# Patient Record
Sex: FEMALE | Race: WHITE | NOT HISPANIC OR LATINO
[De-identification: names, ages, dates, MRNs, and addresses within clinical notes are randomized per-mention and may not be internally consistent; named-entity substitution may affect disease eponyms.]

---

## 2024-08-02 ENCOUNTER — APPOINTMENT (OUTPATIENT)
Dept: ANTEPARTUM | Facility: CLINIC | Age: 34
End: 2024-08-02

## 2024-08-03 ENCOUNTER — INPATIENT (INPATIENT)
Facility: HOSPITAL | Age: 34
LOS: 2 days | Discharge: HOME | End: 2024-08-06
Attending: SPECIALIST | Admitting: SPECIALIST
Payer: MEDICAID

## 2024-08-03 VITALS — HEART RATE: 85 BPM | SYSTOLIC BLOOD PRESSURE: 178 MMHG | DIASTOLIC BLOOD PRESSURE: 100 MMHG

## 2024-08-03 DIAGNOSIS — O26.899 OTHER SPECIFIED PREGNANCY RELATED CONDITIONS, UNSPECIFIED TRIMESTER: ICD-10-CM

## 2024-08-03 LAB
ALBUMIN SERPL ELPH-MCNC: 3.4 G/DL — SIGNIFICANT CHANGE UP (ref 3.3–5)
ALP SERPL-CCNC: 100 U/L — SIGNIFICANT CHANGE UP (ref 40–120)
ALT FLD-CCNC: 8 U/L — SIGNIFICANT CHANGE UP (ref 4–33)
AMPHET UR-MCNC: NEGATIVE — SIGNIFICANT CHANGE UP
ANION GAP SERPL CALC-SCNC: 16 MMOL/L — HIGH (ref 7–14)
APPEARANCE UR: ABNORMAL
AST SERPL-CCNC: 19 U/L — SIGNIFICANT CHANGE UP (ref 4–32)
BARBITURATES UR SCN-MCNC: NEGATIVE — SIGNIFICANT CHANGE UP
BASOPHILS # BLD AUTO: 0.02 K/UL — SIGNIFICANT CHANGE UP (ref 0–0.2)
BASOPHILS NFR BLD AUTO: 0.2 % — SIGNIFICANT CHANGE UP (ref 0–2)
BENZODIAZ UR-MCNC: NEGATIVE — SIGNIFICANT CHANGE UP
BILIRUB SERPL-MCNC: <0.2 MG/DL — SIGNIFICANT CHANGE UP (ref 0.2–1.2)
BILIRUB UR-MCNC: NEGATIVE — SIGNIFICANT CHANGE UP
BLD GP AB SCN SERPL QL: NEGATIVE — SIGNIFICANT CHANGE UP
BUN SERPL-MCNC: 8 MG/DL — SIGNIFICANT CHANGE UP (ref 7–23)
CALCIUM SERPL-MCNC: 8.8 MG/DL — SIGNIFICANT CHANGE UP (ref 8.4–10.5)
CHLORIDE SERPL-SCNC: 104 MMOL/L — SIGNIFICANT CHANGE UP (ref 98–107)
CO2 SERPL-SCNC: 19 MMOL/L — LOW (ref 22–31)
COCAINE METAB.OTHER UR-MCNC: NEGATIVE — SIGNIFICANT CHANGE UP
COLOR SPEC: YELLOW — SIGNIFICANT CHANGE UP
COMMENT - URINE: SIGNIFICANT CHANGE UP
CREAT ?TM UR-MCNC: 145 MG/DL — SIGNIFICANT CHANGE UP
CREAT SERPL-MCNC: 0.6 MG/DL — SIGNIFICANT CHANGE UP (ref 0.5–1.3)
CREATININE URINE RESULT, DAU: 148 MG/DL — SIGNIFICANT CHANGE UP
DIFF PNL FLD: ABNORMAL
EGFR: 121 ML/MIN/1.73M2 — SIGNIFICANT CHANGE UP
EOSINOPHIL # BLD AUTO: 0.09 K/UL — SIGNIFICANT CHANGE UP (ref 0–0.5)
EOSINOPHIL NFR BLD AUTO: 1 % — SIGNIFICANT CHANGE UP (ref 0–6)
FENTANYL UR QL SCN: NEGATIVE — SIGNIFICANT CHANGE UP
GLUCOSE SERPL-MCNC: 83 MG/DL — SIGNIFICANT CHANGE UP (ref 70–99)
GLUCOSE UR QL: NEGATIVE MG/DL — SIGNIFICANT CHANGE UP
HBV SURFACE AG SERPL QL IA: SIGNIFICANT CHANGE UP
HCT VFR BLD CALC: 27.9 % — LOW (ref 34.5–45)
HGB BLD-MCNC: 8.9 G/DL — LOW (ref 11.5–15.5)
HIV 1+2 AB+HIV1 P24 AG SERPL QL IA: SIGNIFICANT CHANGE UP
IANC: 6.42 K/UL — SIGNIFICANT CHANGE UP (ref 1.8–7.4)
IMM GRANULOCYTES NFR BLD AUTO: 0.6 % — SIGNIFICANT CHANGE UP (ref 0–0.9)
KETONES UR-MCNC: ABNORMAL MG/DL
LDH SERPL L TO P-CCNC: 195 U/L — SIGNIFICANT CHANGE UP (ref 135–225)
LEUKOCYTE ESTERASE UR-ACNC: ABNORMAL
LYMPHOCYTES # BLD AUTO: 1.57 K/UL — SIGNIFICANT CHANGE UP (ref 1–3.3)
LYMPHOCYTES # BLD AUTO: 17.6 % — SIGNIFICANT CHANGE UP (ref 13–44)
MAGNESIUM SERPL-MCNC: 5 MG/DL — HIGH (ref 1.6–2.6)
MCHC RBC-ENTMCNC: 25.8 PG — LOW (ref 27–34)
MCHC RBC-ENTMCNC: 31.9 GM/DL — LOW (ref 32–36)
MCV RBC AUTO: 80.9 FL — SIGNIFICANT CHANGE UP (ref 80–100)
METHADONE UR-MCNC: NEGATIVE — SIGNIFICANT CHANGE UP
MONOCYTES # BLD AUTO: 0.75 K/UL — SIGNIFICANT CHANGE UP (ref 0–0.9)
MONOCYTES NFR BLD AUTO: 8.4 % — SIGNIFICANT CHANGE UP (ref 2–14)
NEUTROPHILS # BLD AUTO: 6.42 K/UL — SIGNIFICANT CHANGE UP (ref 1.8–7.4)
NEUTROPHILS NFR BLD AUTO: 72.2 % — SIGNIFICANT CHANGE UP (ref 43–77)
NITRITE UR-MCNC: NEGATIVE — SIGNIFICANT CHANGE UP
NRBC # BLD: 0 /100 WBCS — SIGNIFICANT CHANGE UP (ref 0–0)
NRBC # FLD: 0 K/UL — SIGNIFICANT CHANGE UP (ref 0–0)
OPIATES UR-MCNC: NEGATIVE — SIGNIFICANT CHANGE UP
OXYCODONE UR-MCNC: NEGATIVE — SIGNIFICANT CHANGE UP
PCP SPEC-MCNC: SIGNIFICANT CHANGE UP
PCP UR-MCNC: NEGATIVE — SIGNIFICANT CHANGE UP
PH UR: 7.5 — SIGNIFICANT CHANGE UP (ref 5–8)
PLATELET # BLD AUTO: 300 K/UL — SIGNIFICANT CHANGE UP (ref 150–400)
POTASSIUM SERPL-MCNC: 4.1 MMOL/L — SIGNIFICANT CHANGE UP (ref 3.5–5.3)
POTASSIUM SERPL-SCNC: 4.1 MMOL/L — SIGNIFICANT CHANGE UP (ref 3.5–5.3)
PROT ?TM UR-MCNC: 31 MG/DL — SIGNIFICANT CHANGE UP
PROT SERPL-MCNC: 6.4 G/DL — SIGNIFICANT CHANGE UP (ref 6–8.3)
PROT UR-MCNC: 100 MG/DL
PROT/CREAT UR-RTO: 0.2 RATIO — SIGNIFICANT CHANGE UP (ref 0–0.2)
RBC # BLD: 3.45 M/UL — LOW (ref 3.8–5.2)
RBC # FLD: 14.6 % — HIGH (ref 10.3–14.5)
RBC CASTS # UR COMP ASSIST: SIGNIFICANT CHANGE UP /HPF (ref 0–4)
RH IG SCN BLD-IMP: POSITIVE — SIGNIFICANT CHANGE UP
RH IG SCN BLD-IMP: POSITIVE — SIGNIFICANT CHANGE UP
RUBV IGG SER-ACNC: 25.2 INDEX — SIGNIFICANT CHANGE UP
RUBV IGG SER-IMP: POSITIVE — SIGNIFICANT CHANGE UP
SODIUM SERPL-SCNC: 139 MMOL/L — SIGNIFICANT CHANGE UP (ref 135–145)
SP GR SPEC: 1.03 — SIGNIFICANT CHANGE UP (ref 1–1.03)
T PALLIDUM AB TITR SER: NEGATIVE — SIGNIFICANT CHANGE UP
THC UR QL: NEGATIVE — SIGNIFICANT CHANGE UP
URATE SERPL-MCNC: 4.4 MG/DL — SIGNIFICANT CHANGE UP (ref 2.5–7)
UROBILINOGEN FLD QL: 1 MG/DL — SIGNIFICANT CHANGE UP (ref 0.2–1)
WBC # BLD: 8.9 K/UL — SIGNIFICANT CHANGE UP (ref 3.8–10.5)
WBC # FLD AUTO: 8.9 K/UL — SIGNIFICANT CHANGE UP (ref 3.8–10.5)
WBC UR QL: SIGNIFICANT CHANGE UP /HPF (ref 0–5)

## 2024-08-03 PROCEDURE — 59514 CESAREAN DELIVERY ONLY: CPT | Mod: U9,GC

## 2024-08-03 DEVICE — SURGICEL SNOW 2 X 4": Type: IMPLANTABLE DEVICE | Status: FUNCTIONAL

## 2024-08-03 RX ORDER — LANOLIN 100 %
1 OINTMENT (GRAM) TOPICAL EVERY 6 HOURS
Refills: 0 | Status: DISCONTINUED | OUTPATIENT
Start: 2024-08-03 | End: 2024-08-06

## 2024-08-03 RX ORDER — ACETAMINOPHEN 500 MG
975 TABLET ORAL
Refills: 0 | Status: DISCONTINUED | OUTPATIENT
Start: 2024-08-03 | End: 2024-08-06

## 2024-08-03 RX ORDER — TRISODIUM CITRATE DIHYDRATE AND CITRIC ACID MONOHYDRATE 500; 334 MG/5ML; MG/5ML
15 SOLUTION ORAL EVERY 6 HOURS
Refills: 0 | Status: DISCONTINUED | OUTPATIENT
Start: 2024-08-03 | End: 2024-08-03

## 2024-08-03 RX ORDER — MAGNESIUM HYDROXIDE 400 MG/5ML
30 SUSPENSION, ORAL (FINAL DOSE FORM) ORAL
Refills: 0 | Status: DISCONTINUED | OUTPATIENT
Start: 2024-08-03 | End: 2024-08-06

## 2024-08-03 RX ORDER — OXYTOCIN/RINGER'S LACTATE 20/1000 ML
333.33 PLASTIC BAG, INJECTION (ML) INTRAVENOUS
Qty: 20 | Refills: 0 | Status: COMPLETED | OUTPATIENT
Start: 2024-08-03 | End: 2024-08-03

## 2024-08-03 RX ORDER — MAGNESIUM SULFATE 500 MG/ML
2 VIAL (ML) INJECTION
Qty: 40 | Refills: 0 | Status: DISCONTINUED | OUTPATIENT
Start: 2024-08-03 | End: 2024-08-04

## 2024-08-03 RX ORDER — IBUPROFEN 200 MG
600 TABLET ORAL EVERY 6 HOURS
Refills: 0 | Status: COMPLETED | OUTPATIENT
Start: 2024-08-03 | End: 2025-07-02

## 2024-08-03 RX ORDER — NIFEDIPINE 20 MG/1
10 CAPSULE, LIQUID FILLED ORAL ONCE
Refills: 0 | Status: COMPLETED | OUTPATIENT
Start: 2024-08-03 | End: 2024-08-03

## 2024-08-03 RX ORDER — MAGNESIUM SULFATE 500 MG/ML
4 VIAL (ML) INJECTION ONCE
Refills: 0 | Status: COMPLETED | OUTPATIENT
Start: 2024-08-03 | End: 2024-08-03

## 2024-08-03 RX ORDER — OXYTOCIN/RINGER'S LACTATE 20/1000 ML
333.33 PLASTIC BAG, INJECTION (ML) INTRAVENOUS
Qty: 20 | Refills: 0 | Status: DISCONTINUED | OUTPATIENT
Start: 2024-08-03 | End: 2024-08-05

## 2024-08-03 RX ORDER — OXYCODONE HYDROCHLORIDE 30 MG/1
5 TABLET ORAL ONCE
Refills: 0 | Status: DISCONTINUED | OUTPATIENT
Start: 2024-08-03 | End: 2024-08-06

## 2024-08-03 RX ORDER — DEXTROSE MONOHYDRATE, SODIUM CHLORIDE, SODIUM LACTATE, CALCIUM CHLORIDE, MAGNESIUM CHLORIDE 1.5; 538; 448; 18.4; 5.08 G/100ML; MG/100ML; MG/100ML; MG/100ML; MG/100ML
1000 SOLUTION INTRAPERITONEAL ONCE
Refills: 0 | Status: COMPLETED | OUTPATIENT
Start: 2024-08-03 | End: 2024-08-03

## 2024-08-03 RX ORDER — DEXTROSE MONOHYDRATE, SODIUM CHLORIDE, SODIUM LACTATE, CALCIUM CHLORIDE, MAGNESIUM CHLORIDE 1.5; 538; 448; 18.4; 5.08 G/100ML; MG/100ML; MG/100ML; MG/100ML; MG/100ML
1000 SOLUTION INTRAPERITONEAL
Refills: 0 | Status: DISCONTINUED | OUTPATIENT
Start: 2024-08-03 | End: 2024-08-05

## 2024-08-03 RX ORDER — BACTERIOSTATIC SODIUM CHLORIDE 0.9 %
1000 VIAL (ML) INJECTION
Refills: 0 | Status: DISCONTINUED | OUTPATIENT
Start: 2024-08-03 | End: 2024-08-03

## 2024-08-03 RX ORDER — SIMETHICONE 125 MG/1
80 TABLET, CHEWABLE ORAL EVERY 4 HOURS
Refills: 0 | Status: DISCONTINUED | OUTPATIENT
Start: 2024-08-03 | End: 2024-08-06

## 2024-08-03 RX ORDER — OXYCODONE HYDROCHLORIDE 30 MG/1
5 TABLET ORAL
Refills: 0 | Status: DISCONTINUED | OUTPATIENT
Start: 2024-08-03 | End: 2024-08-06

## 2024-08-03 RX ORDER — CLOSTRIDIUM TETANI TOXOID ANTIGEN (FORMALDEHYDE INACTIVATED), CORYNEBACTERIUM DIPHTHERIAE TOXOID ANTIGEN (FORMALDEHYDE INACTIVATED), BORDETELLA PERTUSSIS TOXOID ANTIGEN (GLUTARALDEHYDE INACTIVATED), BORDETELLA PERTUSSIS FILAMENTOUS HEMAGGLUTININ ANTIGEN (FORMALDEHYDE INACTIVATED), BORDETELLA PERTUSSIS PERTACTIN ANTIGEN, AND BORDETELLA PERTUSSIS FIMBRIAE 2/3 ANTIGEN 5; 2; 2.5; 5; 3; 5 [LF]/.5ML; [LF]/.5ML; UG/.5ML; UG/.5ML; UG/.5ML; UG/.5ML
0.5 INJECTION, SUSPENSION INTRAMUSCULAR ONCE
Refills: 0 | Status: DISCONTINUED | OUTPATIENT
Start: 2024-08-03 | End: 2024-08-06

## 2024-08-03 RX ORDER — AMPICILLIN 1 G/1
2 INJECTION, POWDER, FOR SOLUTION INTRAMUSCULAR; INTRAVENOUS ONCE
Refills: 0 | Status: COMPLETED | OUTPATIENT
Start: 2024-08-03 | End: 2024-08-03

## 2024-08-03 RX ORDER — AMPICILLIN 1 G/1
1 INJECTION, POWDER, FOR SOLUTION INTRAMUSCULAR; INTRAVENOUS EVERY 4 HOURS
Refills: 0 | Status: DISCONTINUED | OUTPATIENT
Start: 2024-08-03 | End: 2024-08-03

## 2024-08-03 RX ORDER — HEPARIN SODIUM 1000 [USP'U]/ML
5000 INJECTION, SOLUTION INTRAVENOUS; SUBCUTANEOUS EVERY 12 HOURS
Refills: 0 | Status: DISCONTINUED | OUTPATIENT
Start: 2024-08-03 | End: 2024-08-06

## 2024-08-03 RX ORDER — KETOROLAC TROMETHAMINE 10 MG
30 TABLET ORAL EVERY 6 HOURS
Refills: 0 | Status: DISCONTINUED | OUTPATIENT
Start: 2024-08-03 | End: 2024-08-04

## 2024-08-03 RX ORDER — DEXTROSE MONOHYDRATE, SODIUM CHLORIDE, SODIUM LACTATE, CALCIUM CHLORIDE, MAGNESIUM CHLORIDE 1.5; 538; 448; 18.4; 5.08 G/100ML; MG/100ML; MG/100ML; MG/100ML; MG/100ML
1000 SOLUTION INTRAPERITONEAL
Refills: 0 | Status: DISCONTINUED | OUTPATIENT
Start: 2024-08-03 | End: 2024-08-03

## 2024-08-03 RX ORDER — CHLORHEXIDINE GLUCONATE 500 MG/1
1 CLOTH TOPICAL DAILY
Refills: 0 | Status: DISCONTINUED | OUTPATIENT
Start: 2024-08-03 | End: 2024-08-03

## 2024-08-03 RX ORDER — BACTERIOSTATIC SODIUM CHLORIDE 0.9 %
350 VIAL (ML) INJECTION ONCE
Refills: 0 | Status: COMPLETED | OUTPATIENT
Start: 2024-08-03 | End: 2024-08-03

## 2024-08-03 RX ORDER — DIPHENHYDRAMINE HCL 25 MG
25 CAPSULE ORAL EVERY 6 HOURS
Refills: 0 | Status: DISCONTINUED | OUTPATIENT
Start: 2024-08-03 | End: 2024-08-06

## 2024-08-03 RX ORDER — OXYTOCIN/RINGER'S LACTATE 20/1000 ML
PLASTIC BAG, INJECTION (ML) INTRAVENOUS
Qty: 30 | Refills: 0 | Status: DISCONTINUED | OUTPATIENT
Start: 2024-08-03 | End: 2024-08-03

## 2024-08-03 RX ORDER — MAGNESIUM SULFATE 500 MG/ML
2 VIAL (ML) INJECTION
Qty: 40 | Refills: 0 | Status: DISCONTINUED | OUTPATIENT
Start: 2024-08-03 | End: 2024-08-03

## 2024-08-03 RX ORDER — NIFEDIPINE 20 MG/1
30 CAPSULE, LIQUID FILLED ORAL DAILY
Refills: 0 | Status: DISCONTINUED | OUTPATIENT
Start: 2024-08-03 | End: 2024-08-03

## 2024-08-03 RX ADMIN — Medication 50 GM/HR: at 11:48

## 2024-08-03 RX ADMIN — Medication 50 GM/HR: at 18:26

## 2024-08-03 RX ADMIN — DEXTROSE MONOHYDRATE, SODIUM CHLORIDE, SODIUM LACTATE, CALCIUM CHLORIDE, MAGNESIUM CHLORIDE 125 MILLILITER(S): 1.5; 538; 448; 18.4; 5.08 SOLUTION INTRAPERITONEAL at 16:33

## 2024-08-03 RX ADMIN — SIMETHICONE 80 MILLIGRAM(S): 125 TABLET, CHEWABLE ORAL at 23:57

## 2024-08-03 RX ADMIN — HEPARIN SODIUM 5000 UNIT(S): 1000 INJECTION, SOLUTION INTRAVENOUS; SUBCUTANEOUS at 16:32

## 2024-08-03 RX ADMIN — Medication 1000 MILLIUNIT(S)/MIN: at 11:49

## 2024-08-03 RX ADMIN — Medication 300 GRAM(S): at 03:34

## 2024-08-03 RX ADMIN — AMPICILLIN 108 GRAM(S): 1 INJECTION, POWDER, FOR SOLUTION INTRAMUSCULAR; INTRAVENOUS at 08:36

## 2024-08-03 RX ADMIN — Medication 50 GM/HR: at 19:20

## 2024-08-03 RX ADMIN — Medication 2 MILLIUNIT(S)/MIN: at 04:31

## 2024-08-03 RX ADMIN — Medication 30 MILLIGRAM(S): at 23:55

## 2024-08-03 RX ADMIN — Medication 50 GM/HR: at 07:09

## 2024-08-03 RX ADMIN — Medication 30 MILLIGRAM(S): at 16:32

## 2024-08-03 RX ADMIN — Medication 30 MILLIGRAM(S): at 17:15

## 2024-08-03 RX ADMIN — DEXTROSE MONOHYDRATE, SODIUM CHLORIDE, SODIUM LACTATE, CALCIUM CHLORIDE, MAGNESIUM CHLORIDE 1000 MILLILITER(S): 1.5; 538; 448; 18.4; 5.08 SOLUTION INTRAPERITONEAL at 07:52

## 2024-08-03 RX ADMIN — NIFEDIPINE 10 MILLIGRAM(S): 20 CAPSULE, LIQUID FILLED ORAL at 02:30

## 2024-08-03 RX ADMIN — Medication 700 MILLILITER(S): at 06:12

## 2024-08-03 RX ADMIN — Medication 50 GM/HR: at 03:57

## 2024-08-03 RX ADMIN — Medication 125 MILLILITER(S): at 07:23

## 2024-08-03 RX ADMIN — NIFEDIPINE 30 MILLIGRAM(S): 20 CAPSULE, LIQUID FILLED ORAL at 02:30

## 2024-08-03 RX ADMIN — AMPICILLIN 200 GRAM(S): 1 INJECTION, POWDER, FOR SOLUTION INTRAMUSCULAR; INTRAVENOUS at 04:23

## 2024-08-03 NOTE — OB PROVIDER DELIVERY SUMMARY - NSSELHIDDEN_OBGYN_ALL_OB_FT
[NS_DeliveryAttending1_OBGYN_ALL_OB_FT:[id number]],[NS_DeliveryRN_OBGYN_ALL_OB_FT:[id number]],[NS_DeliveryAssist1_OBGYN_ALL_OB_FT:[id number]]

## 2024-08-03 NOTE — OB RN DELIVERY SUMMARY - NS_TIMERUPTURED_OBGYN_ALL_OB_FT
[Patient/Caregiver not ready to engage] : , patient/caregiver not ready to engage [Patient/Collateral not ready to engage] : , patient/collateral not ready to engage [AdvancecareDate] : 06/13/23 [] : 06/13/23 24 Hour(s) 34 Minute(s)

## 2024-08-03 NOTE — OB PROVIDER H&P - NS_OBGYNHISTORY_OBGYN_ALL_OB_FT
OBSERVATION is recommended at this time. Patient understands condition, prognosis and need for follow up care. Patient instructed to call for any new reduction in vision or distortion.  3/7/2010 FT 3.5kg   10/21/2013 FT 3.5kg

## 2024-08-03 NOTE — OB NEONATOLOGY/PEDIATRICIAN DELIVERY SUMMARY - NSPEDSNEONOTESA_OBGYN_ALL_OB_FT
Baby is a 37,3 wk AGA male born to a 35 y/o  mother via primary . PEDS called to delivery for Category 2 tracing. Maternal history uncomplicated. Poor prenatal care with this pregnancy (only one prenatal visit on ), mom with SPEC on Magnesium at time of delivery. Maternal blood type B+. PNL HIV negative, HepB, RPR, and Rubella pending. GBS unknown, tx with 1 dose of Ampicillin.  SROM at 0900 on , clear fluids. Delivery uncomplicated. Baby born vigorous and crying spontaneously. Warmed, dried, suctioned and stimulated. Apgars 8/9. Highest maternal temp 36.8 C. EOS 0.08. Mom plans to breastfeed, declines hepB. Circ deferred.   BW: 2670 g  : 2024  TOB: 0934  ADOD:    Physical Exam:  Gen: NAD, +grimace  HEENT: anterior fontanel open soft and flat, no cleft lip/palate, ears normal set, no ear pits or tags. no lesions in mouth/throat, nares clinically patent  Resp: no increased work of breathing, good air entry b/l, clear to auscultation bilaterally  Cardio: normal S1/S2, regular rate and rhythm, no murmur appreciated  Abd: soft, non tender, non distended, + bowel sounds, umbilical cord with 3 vessels  Back: spine midline, no sacral dimple or tuft of hair  Neuro: +grasp/suck/trevor/palmar/plantar, normal tone  Extremities: negative welch and ortolani, moving all extremities, full range of motion x 4, no crepitus  Skin: pink, warm, acrocyanosis  Genitals: Normal male anatomy, testicles palpable in scrotum b/l, Senthil 1, anus patent

## 2024-08-03 NOTE — OB RN PATIENT PROFILE - ALERT: PERTINENT HISTORY
Problem: Patient Care Overview  Goal: Plan of Care/Patient Progress Review  Outcome: Improving  BP readings this shift 131/91 and 120/68, denies PIH symptoms, no clonus DTR, Left side brisk, Right side normal.Cold packs not helping with perineal discomfort but oxycodone effective, discussed with patient about being cautious using home made icepacks to perineum as may cause tissue damage. Would like to go home this evening if baby discharged and OB agreeable.        None

## 2024-08-03 NOTE — OB RN DELIVERY SUMMARY - NSSELHIDDEN_OBGYN_ALL_OB_FT
[NS_DeliveryAttending1_OBGYN_ALL_OB_FT:[id number]],[NS_DeliveryRN_OBGYN_ALL_OB_FT:[id number]],[NS_DeliveryAssist1_OBGYN_ALL_OB_FT:[id number]=]

## 2024-08-03 NOTE — OB RN PATIENT PROFILE - PRO VDRL INFANT
Total time spent including the following  [x] Physical chart review and documentation   An extensive review of the physical chart, electronic health record, and documentation was conducted to obtain collateral information including but not limited to:   - Current inpatient records (prior progress notes)    - Inpatient values/results (CT results)   - Social work assessments   - Current or proposed treatment plans   - Pharmacotherapy review (including I-STOP if applicable)  [x]care coordination  [x]discussion with floor staff (patient's RN)  [x]discussion with the patient, surrogate decision maker, or family  [x]Physical Exam and/or review of systems   [x]Formulation of assessment and plan   [x]Evaluating for response to treatment and side effects of opioids or benzodiazepines Total time spent including the following  [x] Physical chart review and documentation   An extensive review of the physical chart, electronic health record, and documentation was conducted to obtain collateral information including but not limited to:   - Current inpatient records (prior progress notes)    - Social work assessments   - Current or proposed treatment plans   - Pharmacotherapy review (including I-STOP if applicable)  [x]care coordination  [x]discussion with floor staff (patient's RN and IDT)  [x]discussion with the patient's surrogate decision maker.   [x]Physical Exam and/or review of systems   [x]Formulation of assessment and plan   [x]Evaluating for response to treatment and side effects of opioids or benzodiazepines unknown

## 2024-08-03 NOTE — OB PROVIDER H&P - ASSESSMENT
A&P: 34 year old  at 37w+1d presents in labor at term with SROM. In triage patient noted to have sustained severe range blood pressure. Patient to be kept in early labor as well as for treatment of pre-eclmpasia with severe feautres  Labor: admit to L&D  -pit  -s/p Pro 10 IR, patient to be started on Pro 30xL  -Mg for seizure and storke ppx  -routine labs, HELLP labs  -EFM/toco  -NPO, IV hydration  Fetal: cat 1 tracing, fetal status reassuring  GBS: unknown, will need antibiotics at 18 hours of ruputred  Analgesia: epi PRN      d/w Dr Kip Beebe PGY4

## 2024-08-03 NOTE — OB RN DELIVERY SUMMARY - NS_DELIVERYATTENDING1_OBGYN_ALL_OB_FT
moderate fat loss & muscle wasting; sign wt loss 14% x 7 wks;  <75% energy & protein needs x 7 wks
William Goldstein MD

## 2024-08-03 NOTE — OB RN PATIENT PROFILE - PROVIDER NOTIFICATION
If you had a biopsy, you should not take aspirin or aspirin like products for the next 10 days unless instructed to do so by your doctor. If you had a biopsy, check with your doctor before taking any blood thinners such as warfarin (Coumadin).
Declines

## 2024-08-03 NOTE — OB RN PATIENT PROFILE - FALL HARM RISK - UNIVERSAL INTERVENTIONS
Bed in lowest position, wheels locked, appropriate side rails in place/Call bell, personal items and telephone in reach/Instruct patient to call for assistance before getting out of bed or chair/Non-slip footwear when patient is out of bed/[place] to call system/Physically safe environment - no spills, clutter or unnecessary equipment/Purposeful Proactive Rounding/Room/bathroom lighting operational, light cord in reach

## 2024-08-03 NOTE — OB PROVIDER H&P - HISTORY OF PRESENT ILLNESS
R4 H&P    Pt is a 33y/o  at 37+1 presents with concerns for rupture of membranes and labor at term. Patient states that she has been leaking fluid since 9a on . Endorses irregular contractions. Dnies VB. Endorses FM  Prenatal course c/b..  1. Scant prenatal care  -patient had one visit with an OB in [place]  GBS unknown  EFW 3400    OBHx:    3.5kg    3.5kg  GynHx: Denies cysts, fibroid, abnormal paps, STIs  PMHx: Denies  PSHx: Denies  Med: Denies  All: NKDA  SH: Denies toxic habits x3

## 2024-08-03 NOTE — OB PROVIDER DELIVERY SUMMARY - NSPROVIDERDELIVERYNOTE_OBGYN_ALL_OB_FT
primary low transverse  section live , 5lbs. 14oz. uncomplicated,   KAREEN Goldstein M.D. primary LTCS for cat 2 FHT remote from delivery   viable male infant, vertex OP presentation, Apgars 8/9, cord gasses sent  grossly normal fallopian tubes, uterus, and ovaries  uterus closed in 1 layer with caprosyn  surgicel placed atop hysterotomy    QBL: 632  IVF: 1250  UOP: 80    Dictation #[id number]    MURRAY Schmitt, PGY4  w/ Dr. Goldstein    primary low transverse  section live , 5lbs. 14oz. uncomplicated,   KAREEN Goldstein M.D.

## 2024-08-03 NOTE — OB RN TRIAGE NOTE - FALL HARM RISK - UNIVERSAL INTERVENTIONS
Bed in lowest position, wheels locked, appropriate side rails in place/Call bell, personal items and telephone in reach/Instruct patient to call for assistance before getting out of bed or chair/Non-slip footwear when patient is out of bed/[place] to call system/Physically safe environment - no spills, clutter or unnecessary equipment/Purposeful Proactive Rounding/Room/bathroom lighting operational, light cord in reach

## 2024-08-03 NOTE — OB PROVIDER H&P - PRO INTERPRETER NEED 2
Activity Restrictions:   No lifting, pushing, or pulling any objects greater than 10 lbs for 1 week   No strenuous activity (running, jogging, working out, heavy lifting/cleaning) for 1 week  Minimize stair climbing for 48 hours.     Notify Your Physician if:   You have a fever (greater than 100°F), chills, or difficulty swallowing that is new.   Any drainage or swelling from your groin site.   Groin site becomes warm to the touch, red, inflamed or you notice thick drainage from the site.   You have increased shortness of breath, cough, or increased chest pressure   You experience slurred speech, loss of sensation in your arms or legs, or any other stroke-like symptoms     What to Expect:   You may have slight bruising to your groin sites. Notify your physician if the bruising increases.     Groin Care:   You may remove your groin bandages next day after discharge. You do not need to put a dressing over the procedure/incision site after 24 hours   You may SHOWER after 24 hours. Wash gently, pat dry.   Avoid using powders, creams, or lotions near your groin sites.   No soaking in bath tubs, hot tubs, or swimming pools for 1 week.  Your puncture sites should improve each day. If you notice increased pain, swelling, redness, or drainage at the site, call your physician.                                                                                                                                                                                                                                                       Patient Observation  [facility]    September 27, 2023    Patient:  Jak Kenyon  [address]  [facility] [address]                To Whom This May Concern,      Please excuse Jak Kenyon from gym/vigorous activity from 9/28/2023 through 10/6/2023.  She underwent a medical procedure that was performed at [facility].   As always your health is our primary concern and  we want to thank you for choosing [facility] for your care.     Sincerely,       Clyde Botello MD        Other [place]

## 2024-08-03 NOTE — OB RN DELIVERY SUMMARY - NS_SEPSISRSKCALC_OBGYN_ALL_OB_FT
EOS calculated successfully. EOS Risk Factor: 0.5/1000 live births ([facility] national incidence); GA=37w3d; Temp=98.24; ROM=24.567; GBS='Unknown'; Antibiotics='Broad spectrum antibiotics > 4 hrs prior to birth'

## 2024-08-03 NOTE — OB PROVIDER LABOR PROGRESS NOTE - ASSESSMENT
Variability did not improve with scalp stimulation. Discussed with patient inability to augment labor due to persistent cat 2 FHT despite interventions, recommendation for  section at this time through [place] . Patient accepts, risks/benefits/alternatives discussed with patient. Consents signed and witnessed.    D/w Dr. Cruz Schmitt PGY4 
patient presented in [place], met criteria for sPEC, re-examined 2/2 cat 2 tracing    -IUPC placed, AI to be started  -pitocin paused  -patient requesting epidural  -c/w fhr/toco    discussed w dr elizabeth dorado pgy4

## 2024-08-03 NOTE — OB PROVIDER H&P - NSHPPHYSICALEXAM_GEN_ALL_CORE
VS:  Vital Signs Last 24 Hrs  T(C): 36.6 (03 Aug 2024 02:25), Max: 36.6 (03 Aug 2024 02:25)  T(F): 97.9 (03 Aug 2024 02:25), Max: 97.9 (03 Aug 2024 02:25)  HR: 73 (03 Aug 2024 02:25) (73 - 85)  BP: 178/100 (03 Aug 2024 02:25) (167/90 - 178/100)  BP(mean): --  RR: 16 (03 Aug 2024 02:25) (16 - 16)  SpO2: --    Parameters below as of 03 Aug 2024 02:25  Patient On (Oxygen Delivery Method): room air      GA: NAD  Cards: RRR  Pulm: CTAB  EFH: reactive and reassuring  Regal: irreuglar  VE: 3/60/-3  TAUS: vertex  Speculum: +Nitrazine, +Pooling

## 2024-08-03 NOTE — OB RN INTRAOPERATIVE NOTE - NSSELHIDDEN_OBGYN_ALL_OB_FT
[NS_DeliveryAttending1_OBGYN_ALL_OB_FT:[id number]],[NS_DeliveryRN_OBGYN_ALL_OB_FT:[id number]],[NS_DeliveryAssist1_OBGYN_ALL_OB_FT:[id number]]

## 2024-08-04 LAB
ALBUMIN SERPL ELPH-MCNC: 2.7 G/DL — LOW (ref 3.3–5)
ALP SERPL-CCNC: 78 U/L — SIGNIFICANT CHANGE UP (ref 40–120)
ALT FLD-CCNC: 7 U/L — SIGNIFICANT CHANGE UP (ref 4–33)
ANION GAP SERPL CALC-SCNC: 9 MMOL/L — SIGNIFICANT CHANGE UP (ref 7–14)
ANISOCYTOSIS BLD QL: SLIGHT — SIGNIFICANT CHANGE UP
AST SERPL-CCNC: 15 U/L — SIGNIFICANT CHANGE UP (ref 4–32)
BASOPHILS # BLD AUTO: 0 K/UL — SIGNIFICANT CHANGE UP (ref 0–0.2)
BASOPHILS NFR BLD AUTO: 0 % — SIGNIFICANT CHANGE UP (ref 0–2)
BILIRUB SERPL-MCNC: <0.2 MG/DL — SIGNIFICANT CHANGE UP (ref 0.2–1.2)
BUN SERPL-MCNC: 4 MG/DL — LOW (ref 7–23)
CALCIUM SERPL-MCNC: 6.6 MG/DL — LOW (ref 8.4–10.5)
CHLORIDE SERPL-SCNC: 103 MMOL/L — SIGNIFICANT CHANGE UP (ref 98–107)
CO2 SERPL-SCNC: 23 MMOL/L — SIGNIFICANT CHANGE UP (ref 22–31)
CREAT SERPL-MCNC: 0.57 MG/DL — SIGNIFICANT CHANGE UP (ref 0.5–1.3)
EGFR: 122 ML/MIN/1.73M2 — SIGNIFICANT CHANGE UP
EOSINOPHIL # BLD AUTO: 0 K/UL — SIGNIFICANT CHANGE UP (ref 0–0.5)
EOSINOPHIL NFR BLD AUTO: 0 % — SIGNIFICANT CHANGE UP (ref 0–6)
GIANT PLATELETS BLD QL SMEAR: PRESENT — SIGNIFICANT CHANGE UP
GLUCOSE SERPL-MCNC: 103 MG/DL — HIGH (ref 70–99)
HCT VFR BLD CALC: 20.2 % — CRITICAL LOW (ref 34.5–45)
HCT VFR BLD CALC: 21.8 % — LOW (ref 34.5–45)
HCT VFR BLD CALC: 25.1 % — LOW (ref 34.5–45)
HGB BLD-MCNC: 6.6 G/DL — CRITICAL LOW (ref 11.5–15.5)
HGB BLD-MCNC: 7 G/DL — CRITICAL LOW (ref 11.5–15.5)
HGB BLD-MCNC: 8.3 G/DL — LOW (ref 11.5–15.5)
IANC: 10.51 K/UL — HIGH (ref 1.8–7.4)
LDH SERPL L TO P-CCNC: 246 U/L — HIGH (ref 135–225)
LYMPHOCYTES # BLD AUTO: 0.63 K/UL — LOW (ref 1–3.3)
LYMPHOCYTES # BLD AUTO: 5.3 % — LOW (ref 13–44)
MAGNESIUM SERPL-MCNC: 5.2 MG/DL — HIGH (ref 1.6–2.6)
MAGNESIUM SERPL-MCNC: 5.5 MG/DL — HIGH (ref 1.6–2.6)
MANUAL SMEAR VERIFICATION: SIGNIFICANT CHANGE UP
MCHC RBC-ENTMCNC: 25.5 PG — LOW (ref 27–34)
MCHC RBC-ENTMCNC: 25.7 PG — LOW (ref 27–34)
MCHC RBC-ENTMCNC: 26.7 PG — LOW (ref 27–34)
MCHC RBC-ENTMCNC: 32.1 GM/DL — SIGNIFICANT CHANGE UP (ref 32–36)
MCHC RBC-ENTMCNC: 32.7 GM/DL — SIGNIFICANT CHANGE UP (ref 32–36)
MCHC RBC-ENTMCNC: 33.1 GM/DL — SIGNIFICANT CHANGE UP (ref 32–36)
MCV RBC AUTO: 78.6 FL — LOW (ref 80–100)
MCV RBC AUTO: 79.3 FL — LOW (ref 80–100)
MCV RBC AUTO: 80.7 FL — SIGNIFICANT CHANGE UP (ref 80–100)
MICROCYTES BLD QL: SLIGHT — SIGNIFICANT CHANGE UP
MONOCYTES # BLD AUTO: 0.21 K/UL — SIGNIFICANT CHANGE UP (ref 0–0.9)
MONOCYTES NFR BLD AUTO: 1.8 % — LOW (ref 2–14)
NEUTROPHILS # BLD AUTO: 11.01 K/UL — HIGH (ref 1.8–7.4)
NEUTROPHILS NFR BLD AUTO: 92 % — HIGH (ref 43–77)
NEUTS BAND # BLD: 0.9 % — SIGNIFICANT CHANGE UP (ref 0–6)
NRBC # BLD: 0 /100 WBCS — SIGNIFICANT CHANGE UP (ref 0–0)
NRBC # BLD: 0 /100 WBCS — SIGNIFICANT CHANGE UP (ref 0–0)
NRBC # FLD: 0 K/UL — SIGNIFICANT CHANGE UP (ref 0–0)
NRBC # FLD: 0 K/UL — SIGNIFICANT CHANGE UP (ref 0–0)
OVALOCYTES BLD QL SMEAR: SLIGHT — SIGNIFICANT CHANGE UP
PLAT MORPH BLD: ABNORMAL
PLATELET # BLD AUTO: 239 K/UL — SIGNIFICANT CHANGE UP (ref 150–400)
PLATELET # BLD AUTO: 258 K/UL — SIGNIFICANT CHANGE UP (ref 150–400)
PLATELET # BLD AUTO: 279 K/UL — SIGNIFICANT CHANGE UP (ref 150–400)
PLATELET COUNT - ESTIMATE: NORMAL — SIGNIFICANT CHANGE UP
POIKILOCYTOSIS BLD QL AUTO: SLIGHT — SIGNIFICANT CHANGE UP
POLYCHROMASIA BLD QL SMEAR: SLIGHT — SIGNIFICANT CHANGE UP
POTASSIUM SERPL-MCNC: 4.1 MMOL/L — SIGNIFICANT CHANGE UP (ref 3.5–5.3)
POTASSIUM SERPL-SCNC: 4.1 MMOL/L — SIGNIFICANT CHANGE UP (ref 3.5–5.3)
PROT SERPL-MCNC: 4.9 G/DL — LOW (ref 6–8.3)
RBC # BLD: 2.57 M/UL — LOW (ref 3.8–5.2)
RBC # BLD: 2.75 M/UL — LOW (ref 3.8–5.2)
RBC # BLD: 3.11 M/UL — LOW (ref 3.8–5.2)
RBC # FLD: 14.6 % — HIGH (ref 10.3–14.5)
RBC # FLD: 14.6 % — HIGH (ref 10.3–14.5)
RBC # FLD: 14.9 % — HIGH (ref 10.3–14.5)
RBC BLD AUTO: ABNORMAL
SODIUM SERPL-SCNC: 135 MMOL/L — SIGNIFICANT CHANGE UP (ref 135–145)
URATE SERPL-MCNC: 4 MG/DL — SIGNIFICANT CHANGE UP (ref 2.5–7)
WBC # BLD: 11.56 K/UL — HIGH (ref 3.8–10.5)
WBC # BLD: 11.85 K/UL — HIGH (ref 3.8–10.5)
WBC # BLD: 11.96 K/UL — HIGH (ref 3.8–10.5)
WBC # FLD AUTO: 11.56 K/UL — HIGH (ref 3.8–10.5)
WBC # FLD AUTO: 11.85 K/UL — HIGH (ref 3.8–10.5)
WBC # FLD AUTO: 11.96 K/UL — HIGH (ref 3.8–10.5)

## 2024-08-04 RX ORDER — DIPHENHYDRAMINE HCL 25 MG
25 CAPSULE ORAL EVERY 6 HOURS
Refills: 0 | Status: DISCONTINUED | OUTPATIENT
Start: 2024-08-04 | End: 2024-08-06

## 2024-08-04 RX ORDER — DEXTROSE MONOHYDRATE, SODIUM CHLORIDE, SODIUM LACTATE, CALCIUM CHLORIDE, MAGNESIUM CHLORIDE 1.5; 538; 448; 18.4; 5.08 G/100ML; MG/100ML; MG/100ML; MG/100ML; MG/100ML
1000 SOLUTION INTRAPERITONEAL
Refills: 0 | Status: DISCONTINUED | OUTPATIENT
Start: 2024-08-04 | End: 2024-08-05

## 2024-08-04 RX ORDER — IBUPROFEN 200 MG
600 TABLET ORAL EVERY 6 HOURS
Refills: 0 | Status: DISCONTINUED | OUTPATIENT
Start: 2024-08-04 | End: 2024-08-06

## 2024-08-04 RX ORDER — MAGNESIUM SULFATE 500 MG/ML
2 VIAL (ML) INJECTION
Qty: 40 | Refills: 0 | Status: DISCONTINUED | OUTPATIENT
Start: 2024-08-04 | End: 2024-08-05

## 2024-08-04 RX ADMIN — Medication 30 MILLIGRAM(S): at 05:07

## 2024-08-04 RX ADMIN — Medication 600 MILLIGRAM(S): at 15:33

## 2024-08-04 RX ADMIN — Medication 975 MILLIGRAM(S): at 02:45

## 2024-08-04 RX ADMIN — Medication 25 MILLIGRAM(S): at 11:31

## 2024-08-04 RX ADMIN — Medication 600 MILLIGRAM(S): at 16:06

## 2024-08-04 RX ADMIN — Medication 975 MILLIGRAM(S): at 12:20

## 2024-08-04 RX ADMIN — Medication 50 GM/HR: at 07:36

## 2024-08-04 RX ADMIN — Medication 975 MILLIGRAM(S): at 11:32

## 2024-08-04 RX ADMIN — Medication 30 MILLIGRAM(S): at 05:35

## 2024-08-04 RX ADMIN — Medication 975 MILLIGRAM(S): at 03:30

## 2024-08-04 RX ADMIN — SIMETHICONE 80 MILLIGRAM(S): 125 TABLET, CHEWABLE ORAL at 08:39

## 2024-08-04 RX ADMIN — HEPARIN SODIUM 5000 UNIT(S): 1000 INJECTION, SOLUTION INTRAVENOUS; SUBCUTANEOUS at 05:06

## 2024-08-04 RX ADMIN — HEPARIN SODIUM 5000 UNIT(S): 1000 INJECTION, SOLUTION INTRAVENOUS; SUBCUTANEOUS at 17:50

## 2024-08-04 RX ADMIN — Medication 30 MILLIGRAM(S): at 00:39

## 2024-08-04 NOTE — PROGRESS NOTE ADULT - SUBJECTIVE AND OBJECTIVE BOX
OB Progress Note:  Delivery, POD#1    S: 35yo POD#1 s/p LTCS . Her pain is well controlled. She is tolerating a regular diet. Not yet passing flatus. Denies N/V. Denies CP/SOB/lightheadedness/dizziness/palpitations.   Paul and SCDs in place.     O:   Vital Signs Last 24 Hrs  T(C): 36.6 (04 Aug 2024 06:46), Max: 36.6 (03 Aug 2024 20:00)  T(F): 97.8 (04 Aug 2024 06:46), Max: 97.9 (04 Aug 2024 02:30)  HR: 82 (04 Aug 2024 06:46) (82 - 103)  BP: 107/57 (04 Aug 2024 06:46) (104/64 - 126/67)  BP(mean): 74 (04 Aug 2024 02:30) (74 - 89)  RR: 18 (04 Aug 2024 06:46) (12 - 20)  SpO2: 97% (04 Aug 2024 06:46) (94% - 99%)    Parameters below as of 04 Aug 2024 06:46  Patient On (Oxygen Delivery Method): room air        Labs:  Blood type: B Positive  Rubella IgG: RPR: Negative                          6.6<LL>   11.85<H> >-----------< 239    (  @ 05:05 )             20.2<LL>                        8.9<L>   8.90 >-----------< 300    (  @ 02:35 )             27.9<L>    24 @ 05:05      135  |  103  |  4<L>  ----------------------------<  103<H>  4.1   |  23  |  0.57    24 @ 02:35      139  |  104  |  8   ----------------------------<  83  4.1   |  19<L>  |  0.60        Ca    6.6<L>      04 Aug 2024 05:05  Ca    8.8      03 Aug 2024 02:35  Mg     5.50<H>       Mg     5.20<H>       Mg     5.00<H>         TPro  4.9<L>  /  Alb  2.7<L>  /  TBili  <0.2  /  DBili  x   /  AST  15  /  ALT  7   /  AlkPhos  78  24 @ 05:05  TPro  6.4  /  Alb  3.4  /  TBili  <0.2  /  DBili  x   /  AST  19  /  ALT  8   /  AlkPhos  100  24 @ 02:35          PE:  General: NAD  Abdomen: Mildly distended, appropriately tender, incision c/d/i.  Extremities: No erythema, no pitting edema   OB Progress Note:  Delivery, POD#1    S: 33yo POD#1 s/p LTCS . Her pain is well controlled. She is tolerating a regular diet. Not yet passing flatus. Denies N/V. Denies CP/SOB/lightheadedness/dizziness/palpitations.   Luke and SCDs in place.     O:   Vital Signs Last 24 Hrs  T(C): 36.6 (04 Aug 2024 06:46), Max: 36.6 (03 Aug 2024 20:00)  T(F): 97.8 (04 Aug 2024 06:46), Max: 97.9 (04 Aug 2024 02:30)  HR: 82 (04 Aug 2024 06:46) (82 - 103)  BP: 107/57 (04 Aug 2024 06:46) (104/64 - 126/67)  BP(mean): 74 (04 Aug 2024 02:30) (74 - 89)  RR: 18 (04 Aug 2024 06:46) (12 - 20)  SpO2: 97% (04 Aug 2024 06:46) (94% - 99%)    Parameters below as of 04 Aug 2024 06:46  Patient On (Oxygen Delivery Method): room air        Labs:  Blood type: B Positive  Rubella IgG: RPR: Negative                          6.6<LL>   11.85<H> >-----------< 239    (  @ 05:05 )             20.2<LL>                        8.9<L>   8.90 >-----------< 300    (  @ 02:35 )             27.9<L>    24 @ 05:05      135  |  103  |  4<L>  ----------------------------<  103<H>  4.1   |  23  |  0.57    24 @ 02:35      139  |  104  |  8   ----------------------------<  83  4.1   |  19<L>  |  0.60        Ca    6.6<L>      04 Aug 2024 05:05  Ca    8.8      03 Aug 2024 02:35  Mg     5.50<H>       Mg     5.20<H>       Mg     5.00<H>         TPro  4.9<L>  /  Alb  2.7<L>  /  TBili  <0.2  /  DBili  x   /  AST  15  /  ALT  7   /  AlkPhos  78  24 @ 05:05  TPro  6.4  /  Alb  3.4  /  TBili  <0.2  /  DBili  x   /  AST  19  /  ALT  8   /  AlkPhos  100  24 @ 02:35          PE:  General: NAD  Abdomen: Mildly distended, appropriately tender, incision c/d/i.  Extremities: No erythema, no pitting edema, SCDs in place  : luke inplace, clear urine

## 2024-08-04 NOTE — PROGRESS NOTE ADULT - ASSESSMENT
A/P: 33yo POD#1 s/p LTCS c/b sPEC.  Patient is feeling well postop.      #sPEC   - VSS  - c/w Magnesium and procardia 30     #postop  - Continue regular diet.  - Increase ambulation.  - Continue motrin, tylenol, oxycodone PRN for pain control.    -   - Hct 27.9->20.2  - Hct value discussed with patient. will repeat CBC stat and trend hematocrit. Pt agreeable to blood transfusion if hematocrit continues to drop with STAT repeat CBC     Dipika Grady, PGY1

## 2024-08-04 NOTE — PROGRESS NOTE ADULT - ATTENDING COMMENTS
Pt seen and examined.   used to communicate with pt ID# [id number] Celestina.  Pt reports feeling dizzy yesterday and today, states that "room is spinning".  Pt denies palpitations.  Pt also denies HA, CP, SOB, calf pain, fevers/chills.  Pt reports good pain control. Pt tolerating regular diet -N/-V awaiting flatus/ -BM.   Paul recently discontinued, due to void.      ICU Vital Signs Last 24 Hrs  T(C): 36.4 (04 Aug 2024 15:15), Max: 36.7 (04 Aug 2024 10:07)  T(F): 97.5 (04 Aug 2024 15:15), Max: 98.1 (04 Aug 2024 10:07)  HR: 89 (04 Aug 2024 15:15) (82 - 102)  BP: 120/76 (04 Aug 2024 15:15) (104/64 - 125/74)  BP(mean): 74 (04 Aug 2024 02:30) (74 - 89)  ABP: --  ABP(mean): --  RR: 18 (04 Aug 2024 15:15) (17 - 18)  SpO2: 99% (04 Aug 2024 15:15) (95% - 99%)    O2 Parameters below as of 04 Aug 2024 15:15  Patient On (Oxygen Delivery Method): room air    POD# 1 sp PLTCS course complicated by preeclampsia with severe features and acute blood loss anemia.     1.  Preeclampsia with severe features- sp Magnesium sulfate.  BP's within goal range on current regimen of Procardia 30mg XL.  Pt asymptomatic, will continue to monitor BP's closely.  Discussed diagnosis with pt and partner and planned follow up.     2.  Acute blood loss anemia- QBL 632ml, Hct 27.9--> 20.2-->21.8, pt symptomatic, Recommended 1uPRBC at this time, discussed risks and benefits.  Pt agrees to blood transfusion at this time.  Initially ordered 2uPRBC's, however, blood bank recommends 1 unit and then repeat H/H and reevaluate if 2nd unit is needed.  Pt advised not to get OOB without assistance.     3.  Continue routine pp and post op care    Divine Shin MD

## 2024-08-05 LAB
HCT VFR BLD CALC: 24.7 % — LOW (ref 34.5–45)
HCV RNA SPEC NAA+PROBE-LOG IU: SIGNIFICANT CHANGE UP
HCV RNA SPEC NAA+PROBE-LOG IU: SIGNIFICANT CHANGE UP LOGIU/ML
HGB BLD-MCNC: 8 G/DL — LOW (ref 11.5–15.5)
MCHC RBC-ENTMCNC: 26.2 PG — LOW (ref 27–34)
MCHC RBC-ENTMCNC: 32.4 GM/DL — SIGNIFICANT CHANGE UP (ref 32–36)
MCV RBC AUTO: 81 FL — SIGNIFICANT CHANGE UP (ref 80–100)
NRBC # BLD: 0 /100 WBCS — SIGNIFICANT CHANGE UP (ref 0–0)
NRBC # FLD: 0 K/UL — SIGNIFICANT CHANGE UP (ref 0–0)
PLATELET # BLD AUTO: 279 K/UL — SIGNIFICANT CHANGE UP (ref 150–400)
RBC # BLD: 3.05 M/UL — LOW (ref 3.8–5.2)
RBC # FLD: 14.6 % — HIGH (ref 10.3–14.5)
WBC # BLD: 11.65 K/UL — HIGH (ref 3.8–10.5)
WBC # FLD AUTO: 11.65 K/UL — HIGH (ref 3.8–10.5)

## 2024-08-05 RX ORDER — FERROUS SULFATE 325(65) MG
325 TABLET ORAL THREE TIMES A DAY
Refills: 0 | Status: DISCONTINUED | OUTPATIENT
Start: 2024-08-05 | End: 2024-08-06

## 2024-08-05 RX ORDER — LYSINE HCL 500 MG
500 TABLET ORAL DAILY
Refills: 0 | Status: DISCONTINUED | OUTPATIENT
Start: 2024-08-05 | End: 2024-08-06

## 2024-08-05 RX ORDER — NIFEDIPINE 20 MG/1
30 CAPSULE, LIQUID FILLED ORAL DAILY
Refills: 0 | Status: DISCONTINUED | OUTPATIENT
Start: 2024-08-05 | End: 2024-08-06

## 2024-08-05 RX ORDER — SENNOSIDES 8.6 MG/1
2 TABLET ORAL AT BEDTIME
Refills: 0 | Status: DISCONTINUED | OUTPATIENT
Start: 2024-08-05 | End: 2024-08-06

## 2024-08-05 RX ADMIN — Medication 975 MILLIGRAM(S): at 04:14

## 2024-08-05 RX ADMIN — Medication 975 MILLIGRAM(S): at 03:14

## 2024-08-05 RX ADMIN — NIFEDIPINE 30 MILLIGRAM(S): 20 CAPSULE, LIQUID FILLED ORAL at 10:37

## 2024-08-05 RX ADMIN — Medication 975 MILLIGRAM(S): at 10:37

## 2024-08-05 RX ADMIN — HEPARIN SODIUM 5000 UNIT(S): 1000 INJECTION, SOLUTION INTRAVENOUS; SUBCUTANEOUS at 06:40

## 2024-08-05 RX ADMIN — Medication 600 MILLIGRAM(S): at 01:17

## 2024-08-05 RX ADMIN — Medication 600 MILLIGRAM(S): at 23:30

## 2024-08-05 RX ADMIN — Medication 600 MILLIGRAM(S): at 00:17

## 2024-08-05 RX ADMIN — HEPARIN SODIUM 5000 UNIT(S): 1000 INJECTION, SOLUTION INTRAVENOUS; SUBCUTANEOUS at 18:36

## 2024-08-05 RX ADMIN — Medication 975 MILLIGRAM(S): at 18:17

## 2024-08-05 RX ADMIN — Medication 600 MILLIGRAM(S): at 22:25

## 2024-08-05 RX ADMIN — Medication 975 MILLIGRAM(S): at 17:17

## 2024-08-05 RX ADMIN — SENNOSIDES 2 TABLET(S): 8.6 TABLET ORAL at 22:25

## 2024-08-05 RX ADMIN — Medication 975 MILLIGRAM(S): at 11:30

## 2024-08-05 RX ADMIN — Medication 325 MILLIGRAM(S): at 22:26

## 2024-08-05 RX ADMIN — Medication 600 MILLIGRAM(S): at 06:40

## 2024-08-05 RX ADMIN — Medication 325 MILLIGRAM(S): at 17:30

## 2024-08-05 NOTE — PROGRESS NOTE ADULT - ASSESSMENT
A/P: 35yo POD#2 s/p pLTCS c/b sPEC and low H/H.  Patient is feeling well postop.      #sPEC  - s/p Magnesium for seizure prophylaxis  - BP's well controlled overnight  - Cont Procardia 30 QD   - HELLP labs wnl, P/C 0.2  - cont to monitor for S/s sPEC  - d/c home with BP cuff     #anemia  -   - Hct 27.9->20.2->21.8->1uPRBC->25.1->24.7  - VSS  - continue to monitor     #postopartum  - Continue regular diet.  - Increase ambulation.  - Continue motrin, tylenol, oxycodone PRN for pain control.    - Postpartum contraception: POPs A Sacks, PGY1    A/P: 33yo POD#2 s/p pLTCS c/b sPEC and low H/H.  Patient is feeling well postop.      #sPEC  - s/p Magnesium for seizure prophylaxis  - BP's well controlled overnight  - Cont Procardia 30 QD   - HELLP labs wnl, P/C 0.2  - cont to monitor for S/s sPEC  - d/c home with BP cuff     #anemia  -   - Hct 27.9->20.2->21.8->1uPRBC->25.1->24.7  - VSS  - continue to monitor     #postpartum  - Continue regular diet.  - Increase ambulation.  - Continue motrin, tylenol, oxycodone PRN for pain control.    - Postpartum contraception: POPs A Sacks, PGY1    ID: [id number] and [id number]

## 2024-08-05 NOTE — PROGRESS NOTE ADULT - ATTENDING COMMENTS
Pt seen and examined.   used to communicate with pt ID# [id number] Maria Dolores.  Pt reports "nothing bothers me, I feel great".  Pt states she no longer feels dizzy s/p blood transfusion, also denies palpitations.  Pt also denies HA, CP, SOB, calf pain, fevers/chills.  Pt reports good pain control. Pt tolerating regular diet -N/-V + flatus/ -BM.   Pt voiding and ambulating without difficulty.  Pt reports decreasing lochia.     ICU Vital Signs Last 24 Hrs  T(C): 36.9 (05 Aug 2024 09:58), Max: 36.9 (04 Aug 2024 18:40)  T(F): 98.4 (05 Aug 2024 09:58), Max: 98.4 (04 Aug 2024 18:40)  HR: 88 (05 Aug 2024 09:58) (71 - 89)  BP: 138/84 (05 Aug 2024 09:58) (114/73 - 138/84)  BP(mean): --  ABP: --  ABP(mean): --  RR: 18 (05 Aug 2024 09:58) (18 - 18)  SpO2: 99% (05 Aug 2024 09:58) (96% - 99%)    O2 Parameters below as of 05 Aug 2024 09:58  Patient On (Oxygen Delivery Method): room air        POD# 2 sp PLTCS course complicated by preeclampsia with severe features and acute blood loss anemia.     1.  Preeclampsia with severe features- sp Magnesium sulfate.  Pt was ordered for Procardia 30mg XL daily, pt last received Procardia 30mg on 8/3.  The order was discontinued on transport.  Procardia was reordered and given this morning.  This was explained to pt.  BP's remain within goal range  Pt asymptomatic, will continue to monitor BP's closely.     2.  Acute blood loss anemia- QBL 632ml, Hct 27.9--> 20.2-->21.8--> 1uPRBC-->25.1 pt asymptomatic, recommend Iron supplement  3.  Continue routine pp and post op care  4.  D/c home tomorrow if no acute events overnight    Divine Shin MD

## 2024-08-05 NOTE — CHART NOTE - NSCHARTNOTEFT_GEN_A_CORE
This is a 33 yo  POD#2 s/p pLTCS (Cat 2 tracing remote from delivery) c/b sPEC/Mg. On chart review, it was noted that patient did not receive her Procardia 30xL on . On further chart review it was noted that the order was discontinued on patient transfer. Patient's blood pressures noted to range from 110-130/70. Procardia 30xL order reinstated with hold parameters. Nurse instructed to administer.    discussed w Dr Kip Beebe PGY4
PTA  PTA was done with the team due to 2 hrs cat 2 FHT - recurrent late decelerations with intermittent minimal variability. Patient has had IUPC and AI since 6a without improvement. Repeat SVE at 730a, 4/60/-3, unchanged from prior. Epidural in place and BPs 100s/60s, from 120s/70s prior to epidural placement   Indication: CAT II  Plan:  Maternal repositioning  Anesthesia eval for ephedrine  1L LR bolus  Re eval in 30 min    Dr. Arroyo and Dr. Angelita Schmitt PGY4

## 2024-08-05 NOTE — PROGRESS NOTE ADULT - SUBJECTIVE AND OBJECTIVE BOX
OB Progress Note:  Delivery, POD#2    S: 33yo POD#2 s/p pLTCS . Her pain is well controlled. She is tolerating a regular diet. Not yet passing flatus. Denies N/V. Denies CP/SOB/lightheadedness/dizziness.  She is ambulating without difficulty.   Voiding spontaneously. Denies headaches/changes to vision. Denies fevers/chills.     O:   Vital Signs Last 24 Hrs  T(C): 36.4 (05 Aug 2024 06:31), Max: 36.9 (04 Aug 2024 18:40)  T(F): 97.5 (05 Aug 2024 06:), Max: 98.4 (04 Aug 2024 18:40)  HR: 71 (05 Aug 2024 06:) (71 - 94)  BP: 114/73 (05 Aug 2024 06:) (114/73 - 137/76)  BP(mean): --  RR: 18 (05 Aug 2024 06:31) (17 - 18)  SpO2: 98% (05 Aug 2024 06:31) (96% - 99%)    Parameters below as of 05 Aug 2024 06:  Patient On (Oxygen Delivery Method): room air        Labs:  Blood type: B Positive  Rubella IgG: RPR: Negative                          8.0<L>   11.65<H> >-----------< 279    (  @ 05:35 )             24.7<L>                        8.3<L>   11.56<H> >-----------< 279    (  @ 19:40 )             25.1<L>                        7.0<LL>   11.96<H> >-----------< 258    (  @ 08:40 )             21.8<L>                        6.6<LL>   11.85<H> >-----------< 239    (  05:05 )             20.2<LL>                        8.9<L>   8.90 >-----------< 300    (  @ 02:35 )             27.9<L>    - @ 05:05      135  |  103  |  4<L>  ----------------------------<  103<H>  4.1   |  23  |  0.57    24 @ 02:35      139  |  104  |  8   ----------------------------<  83  4.1   |  19<L>  |  0.60        Ca    6.6<L>      04 Aug 2024 05:05  Ca    8.8      03 Aug 2024 02:35  Mg     5.50<H>     08  Mg     5.20<H>     08-  Mg     5.00<H>     08-03    TPro  4.9<L>  /  Alb  2.7<L>  /  TBili  <0.2  /  DBili  x   /  AST  15  /  ALT  7   /  AlkPhos  78  24 @ 05:05  TPro  6.4  /  Alb  3.4  /  TBili  <0.2  /  DBili  x   /  AST  19  /  ALT  8   /  AlkPhos  100  24 @ 02:35          Physical exam:  Gen: NAD  Abdomen: Soft, approproiately tender, Mildly distended, firm uterine fundus at umbilicus.  Incision: Clean, dry, and intact   Pelvic: Normal lochia noted  Ext: No calf tenderness, no erythema, no pitting edema

## 2024-08-06 ENCOUNTER — TRANSCRIPTION ENCOUNTER (OUTPATIENT)
Age: 34
End: 2024-08-06

## 2024-08-06 VITALS
HEART RATE: 97 BPM | RESPIRATION RATE: 18 BRPM | DIASTOLIC BLOOD PRESSURE: 93 MMHG | TEMPERATURE: 98 F | OXYGEN SATURATION: 100 % | SYSTOLIC BLOOD PRESSURE: 134 MMHG

## 2024-08-06 RX ORDER — NIFEDIPINE 20 MG/1
1 CAPSULE, LIQUID FILLED ORAL
Qty: 30 | Refills: 0
Start: 2024-08-06 | End: 2024-09-04

## 2024-08-06 RX ORDER — LYSINE HCL 500 MG
1 TABLET ORAL
Qty: 0 | Refills: 0 | DISCHARGE
Start: 2024-08-06

## 2024-08-06 RX ORDER — LANOLIN 100 %
1 OINTMENT (GRAM) TOPICAL
Qty: 0 | Refills: 0 | DISCHARGE
Start: 2024-08-06

## 2024-08-06 RX ORDER — FERROUS SULFATE 325(65) MG
1 TABLET ORAL
Qty: 0 | Refills: 0 | DISCHARGE
Start: 2024-08-06

## 2024-08-06 RX ORDER — NORETHINDRONE 0.35 MG/1
1 TABLET ORAL
Qty: 30 | Refills: 1
Start: 2024-08-06 | End: 2024-10-04

## 2024-08-06 RX ORDER — IBUPROFEN 200 MG
1 TABLET ORAL
Qty: 0 | Refills: 0 | DISCHARGE
Start: 2024-08-06

## 2024-08-06 RX ORDER — ACETAMINOPHEN 500 MG
3 TABLET ORAL
Qty: 0 | Refills: 0 | DISCHARGE
Start: 2024-08-06

## 2024-08-06 RX ORDER — NIFEDIPINE 20 MG/1
1 CAPSULE, LIQUID FILLED ORAL
Qty: 0 | Refills: 0 | DISCHARGE
Start: 2024-08-06

## 2024-08-06 RX ADMIN — HEPARIN SODIUM 5000 UNIT(S): 1000 INJECTION, SOLUTION INTRAVENOUS; SUBCUTANEOUS at 05:33

## 2024-08-06 RX ADMIN — Medication 975 MILLIGRAM(S): at 05:33

## 2024-08-06 RX ADMIN — Medication 325 MILLIGRAM(S): at 05:40

## 2024-08-06 RX ADMIN — Medication 325 MILLIGRAM(S): at 11:56

## 2024-08-06 RX ADMIN — NIFEDIPINE 30 MILLIGRAM(S): 20 CAPSULE, LIQUID FILLED ORAL at 11:20

## 2024-08-06 RX ADMIN — Medication 975 MILLIGRAM(S): at 05:58

## 2024-08-06 RX ADMIN — Medication 500 MILLIGRAM(S): at 11:56

## 2024-08-06 NOTE — DISCHARGE NOTE OB - PLAN OF CARE
If you had high blood pressure during your pregnancy, during labor, or after delivery, please follow up with your OB/Gyn in 1-3 days for a BP check.    Continue to monitor your blood pressures three times daily at home (before breakfast, lunch, and dinner).  If any blood pressures are GREATER than 160/110, or if you experience changes in your vision, headache not improved with tylenol, persistent nausea, vomiting and/or right upper abdominal pain, present to ED for further evaluation.    If you are taking blood pressure medication, take your blood pressure before taking your medication. If LESS than 110 systolic (top number) and/or LESS than 60 diastolic (bottom number), do not take your medication. After discharge, please stay on pelvic rest for 6 weeks, meaning no sexual intercourse, no tampons and no douching.  No driving for 2 weeks as women can loose a lot of blood during delivery and there is a possibility of being lightheaded/fainting.  No lifting objects heavier than a gallon of milk for two weeks.  Expect to have vaginal bleeding/spotting for up to six weeks.  The bleeding should get lighter and more white/light brown with time.  For bleeding soaking more than a pad an hour or passing clots greater than the size of your fist, come in to the emergency department.    Follow up in clinic in 2 weeks for incision check.  Call clinic for noticeable increase in redness or swelling at incision, discharge from incision, or opening of skin at incision site.

## 2024-08-06 NOTE — DISCHARGE NOTE OB - PROVIDER TOKENS
FREE:[LAST:[[facility]],PHONE:[phone]],FAX:[(   )    -],ADDRESS:[[facility]  Oncology Building  Ambulatory Care Unit  [place], [place]],FOLLOWUP:[1-3 days]]

## 2024-08-06 NOTE — DISCHARGE NOTE OB - NS MD DC FALL RISK RISK
For information on Fall & Injury Prevention, visit: [email or web address] OR  [email or web address] OR  https://www.cdc.gov/steadi/patient.html

## 2024-08-06 NOTE — DISCHARGE NOTE OB - MEDICATION SUMMARY - MEDICATIONS TO TAKE
I will START or STAY ON the medications listed below when I get home from the hospital:    electronic blood pressure cuff  -- If you had high blood pressure during your pregnancy, during labor, or after delivery, please follow up with your OB/Gyn in 1-3 days for a BP check.    Continue to monitor your blood pressures three times daily at home (before breakfast, lunch, and dinner).  If any blood pressures are GREATER than 160/110, or if you experience changes in your vision, headache not improved with tylenol, persistent nausea, vomiting and/or right upper abdominal pain, present to ED for further evaluation.    If you are taking blood pressure medication, take your blood pressure before taking your medication. If LESS than 110 systolic (top number) and/or LESS than 60 diastolic (bottom number), do not take your medication.  -- Indication: For blood pressure    ibuprofen 600 mg oral tablet  -- 1 tab(s) by mouth every 6 hours  -- Indication: For pain    acetaminophen 325 mg oral tablet  -- 3 tab(s) by mouth every 6 hours  -- Indication: For pain    NIFEdipine 30 mg oral tablet, extended release  -- 1 tab(s) by mouth once a day  -- Indication: For blood pressure    lanolin topical ointment  -- 1 Apply on skin to affected area every 6 hours As needed Sore Nipples  -- Indication: For Nipple pain    ferrous sulfate 325 mg (65 mg elemental iron) oral tablet  -- 1 tab(s) by mouth 3 times a day  -- Indication: For anemia, blood loss    ascorbic acid 500 mg oral tablet  -- 1 tab(s) by mouth once a day  -- Indication: For anemia, blood loss

## 2024-08-06 NOTE — PROGRESS NOTE ADULT - SUBJECTIVE AND OBJECTIVE BOX
OB Postpartum Note:  Delivery, POD#3    S: 33yo POD#3 s/p LTCS. The patient feels well.  Pain is well controlled. She is tolerating a regular diet and passing flatus. She is voiding spontaneously, and ambulating without difficulty. Denies CP/SOB. Denies lightheadedness/dizziness. Denies N/V. Denies headaches/changes to vision.     O:  Vitals:  Vital Signs Last 24 Hrs  T(C): 36.4 (06 Aug 2024 10:50), Max: 36.8 (06 Aug 2024 06:20)  T(F): 97.6 (06 Aug 2024 10:50), Max: 98.2 (06 Aug 2024 06:20)  HR: 81 (06 Aug 2024 10:50) (72 - 81)  BP: 139/83 (06 Aug 2024 10:50) (134/82 - 142/84)  BP(mean): --  RR: 18 (06 Aug 2024 10:50) (18 - 18)  SpO2: 100% (06 Aug 2024 10:50) (99% - 100%)    Parameters below as of 05 Aug 2024 14:00  Patient On (Oxygen Delivery Method): room air        MEDICATIONS  (STANDING):  acetaminophen     Tablet .. 975 milliGRAM(s) Oral <User Schedule>  ascorbic acid 500 milliGRAM(s) Oral daily  diphtheria/tetanus/pertussis (acellular) Vaccine (Adacel) 0.5 milliLiter(s) IntraMuscular once  ferrous    sulfate 325 milliGRAM(s) Oral three times a day  heparin   Injectable 5000 Unit(s) SubCutaneous every 12 hours  ibuprofen  Tablet. 600 milliGRAM(s) Oral every 6 hours  NIFEdipine XL 30 milliGRAM(s) Oral daily  senna 2 Tablet(s) Oral at bedtime    MEDICATIONS  (PRN):  diphenhydrAMINE 25 milliGRAM(s) Oral every 6 hours PRN Rash and/or Itching  diphenhydrAMINE 25 milliGRAM(s) Oral every 6 hours PRN Pruritus  lanolin Ointment 1 Application(s) Topical every 6 hours PRN Sore Nipples  magnesium hydroxide Suspension 30 milliLiter(s) Oral two times a day PRN Constipation  oxyCODONE    IR 5 milliGRAM(s) Oral once PRN Moderate to Severe Pain (4-10)  oxyCODONE    IR 5 milliGRAM(s) Oral every 3 hours PRN Moderate to Severe Pain (4-10)  simethicone 80 milliGRAM(s) Chew every 4 hours PRN Gas      LABS:  Blood type: B Positive  Rubella IgG: RPR: Negative                          8.0<L>   11.65<H> >-----------< 279    (  @ 05:35 )             24.7<L>                        8.3<L>   11.56<H> >-----------< 279    (  @ 19:40 )             25.1<L>                        7.0<LL>   11.96<H> >-----------< 258    (  @ 08:40 )             21.8<L>                        6.6<LL>   11.85<H> >-----------< 239    (  @ 05:05 )             20.2<LL>    24 @ 05:05      135  |  103  |  4<L>  ----------------------------<  103<H>  4.1   |  23  |  0.57        Ca    6.6<L>      04 Aug 2024 05:05  Mg     5.50<H>       Mg     5.20<H>       Mg     5.00<H>         TPro  4.9<L>  /  Alb  2.7<L>  /  TBili  <0.2  /  DBili  x   /  AST  15  /  ALT  7   /  AlkPhos  78  24 @ 05:05          Physical exam:  Gen: NAD  Abdomen: Soft, approproiately tender, Mildly distended, firm uterine fundus at umbilicus.  Incision: Clean, dry, and intact   Pelvic: Normal lochia noted  Ext: No calf tenderness, no erythema, no pitting edema     OB Postpartum Note:  Delivery, POD#3    S: 35yo POD#3 s/p LTCS. The patient feels well.  Pain is well controlled. She is tolerating a regular diet and passing flatus. She is voiding spontaneously, and ambulating without difficulty. Denies CP/SOB. Denies lightheadedness/dizziness. Denies N/V. Denies headaches/changes to vision.     O:  Vitals:  Vital Signs Last 24 Hrs  T(C): 36.4 (06 Aug 2024 10:50), Max: 36.8 (06 Aug 2024 06:20)  T(F): 97.6 (06 Aug 2024 10:50), Max: 98.2 (06 Aug 2024 06:20)  HR: 81 (06 Aug 2024 10:50) (72 - 81)  BP: 139/83 (06 Aug 2024 10:50) (134/82 - 142/84)  BP(mean): --  RR: 18 (06 Aug 2024 10:50) (18 - 18)  SpO2: 100% (06 Aug 2024 10:50) (99% - 100%)    Parameters below as of 05 Aug 2024 14:00  Patient On (Oxygen Delivery Method): room air        MEDICATIONS  (STANDING):  acetaminophen     Tablet .. 975 milliGRAM(s) Oral <User Schedule>  ascorbic acid 500 milliGRAM(s) Oral daily  diphtheria/tetanus/pertussis (acellular) Vaccine (Adacel) 0.5 milliLiter(s) IntraMuscular once  ferrous    sulfate 325 milliGRAM(s) Oral three times a day  heparin   Injectable 5000 Unit(s) SubCutaneous every 12 hours  ibuprofen  Tablet. 600 milliGRAM(s) Oral every 6 hours  NIFEdipine XL 30 milliGRAM(s) Oral daily  senna 2 Tablet(s) Oral at bedtime    MEDICATIONS  (PRN):  diphenhydrAMINE 25 milliGRAM(s) Oral every 6 hours PRN Rash and/or Itching  diphenhydrAMINE 25 milliGRAM(s) Oral every 6 hours PRN Pruritus  lanolin Ointment 1 Application(s) Topical every 6 hours PRN Sore Nipples  magnesium hydroxide Suspension 30 milliLiter(s) Oral two times a day PRN Constipation  oxyCODONE    IR 5 milliGRAM(s) Oral once PRN Moderate to Severe Pain (4-10)  oxyCODONE    IR 5 milliGRAM(s) Oral every 3 hours PRN Moderate to Severe Pain (4-10)  simethicone 80 milliGRAM(s) Chew every 4 hours PRN Gas      LABS:  Blood type: B Positive  Rubella IgG: RPR: Negative                          8.0<L>   11.65<H> >-----------< 279    (  @ 05:35 )             24.7<L>                        8.3<L>   11.56<H> >-----------< 279    (  @ 19:40 )             25.1<L>                        7.0<LL>   11.96<H> >-----------< 258    (  @ 08:40 )             21.8<L>                        6.6<LL>   11.85<H> >-----------< 239    (  @ 05:05 )             20.2<LL>    24 @ 05:05      135  |  103  |  4<L>  ----------------------------<  103<H>  4.1   |  23  |  0.57        Ca    6.6<L>      04 Aug 2024 05:05  Mg     5.50<H>       Mg     5.20<H>       Mg     5.00<H>         TPro  4.9<L>  /  Alb  2.7<L>  /  TBili  <0.2  /  DBili  x   /  AST  15  /  ALT  7   /  AlkPhos  78  24 @ 05:05          Physical exam:  Gen: NAD  Abdomen: Soft, appropriately tender, Mildly distended, firm uterine fundus at umbilicus.  Incision: Clean, dry, and intact   Pelvic: Normal lochia noted  Ext: No calf tenderness, no erythema, no pitting edema

## 2024-08-06 NOTE — DISCHARGE NOTE OB - HOSPITAL COURSE
Patient underwent a primary LTCS for category 2 tracing. , Hct 27.9->20.2->21.8->1uPRBC->25.1->24.7. Pregnancy was complicated by preeclampsia with severe features, meeting criteria with elevated blood pressures at time of admission. Patient is s/p Mg and was started on Procardia 30mg XL. Patient's postoperative course was complicated by low H/H, patient started on iron and vitamin C and received 1 unit of pRBC. Patient remained afebrile throughout. Upon discharge on POD 3, the patient was ambulating, voiding spontaneously, tolerating PO intake, pain was well controlled with oral medications, and vital signs were stable. Patient received scant PNC and will follow up at the [facility] for a BP check and either the clinic or Dr. Collette Gutierrez for routine postpartum care. Discharge home with home care.

## 2024-08-06 NOTE — DISCHARGE NOTE OB - CARE PROVIDER_API CALL
[facility],   [facility]  Oncology Building  Ambulatory Care Unit  [place], [place]  Phone: [phone]  Fax: (   )    -  Follow Up Time: 1-3 days

## 2024-08-06 NOTE — PROGRESS NOTE ADULT - ASSESSMENT
A/P: 33yo POD#3 s/p pLTCS c/b sPEC and low H/H.  Patient is feeling well postop.      #sPEC  - s/p Magnesium for seizure prophylaxis  - BP's well controlled overnight  - Cont Procardia 30 QD   - HELLP labs wnl, P/C 0.2  - cont to monitor for S/s sPEC  - pt has BP cuff at home    #anemia  -   - Hct 27.9->20.2->21.8->1uPRBC->25.1->24.7  - VSS  - continue to monitor    #postpartum  - Continue regular diet.  - Increase ambulation.  - Continue motrin, tylenol, oxycodone PRN for pain control.    - Postpartum contraception: POPs  - Discharge planning: [facility] clinic for BP check, outpatient provider Dr. Gutierrez for 6w PP visit     A Sacks, PGY1    ID: [id number]

## 2024-08-06 NOTE — DISCHARGE NOTE OB - ADDITIONAL INSTRUCTIONS
Follow up at the [facility] 1-3 days after discharge for a blood pressure check.  Follow up at the [facility] or with your outpatient provider in 4-6 weeks for routine postpartum care.

## 2024-08-06 NOTE — PROGRESS NOTE ADULT - ATTENDING COMMENTS
ID [id number].  BPs within goal range on Procardia 30mg XL.  Pt asymptomatic.  Discussed need for follow up by Monday.  Discussed BP parameters, and when to return to call or return to hospital.  All questions and concerns addressed.       Divine Shin MD  ID [id number].  BPs within goal range on Procardia 30mg XL.  Pt asymptomatic.  Informed pt medication to be sent to [facility] here.   Pt states she already has BP cuff at home.  Discussed need for follow up by Monday.  Discussed BP parameters, and when to return to call or return to hospital.  All questions and concerns addressed.       Divine Shin MD  ID [id number].  BPs within goal range on Procardia 30mg XL.  Pt asymptomatic.  Informed pt medication to be sent to [facility] here.   Pt states she already has BP cuff at home.  Discussed need for follow up by Monday.  Discussed BP parameters, and when to return to call or return to hospital.  All questions and concerns addressed.       Pt cleared for discharge home    Divine Shin MD

## 2024-08-06 NOTE — DISCHARGE NOTE OB - PATIENT PORTAL LINK FT
You can access the FollowMyHealth Patient Portal offered by [facility] by registering at the following website: [email or web address]. By joining [facility]’s FollowMyHealth portal, you will also be able to view your health information using other applications (apps) compatible with our system.

## 2024-08-06 NOTE — DISCHARGE NOTE OB - CARE PLAN
1 Principal Discharge DX:	 delivery delivered  Assessment and plan of treatment:	After discharge, please stay on pelvic rest for 6 weeks, meaning no sexual intercourse, no tampons and no douching.  No driving for 2 weeks as women can loose a lot of blood during delivery and there is a possibility of being lightheaded/fainting.  No lifting objects heavier than a gallon of milk for two weeks.  Expect to have vaginal bleeding/spotting for up to six weeks.  The bleeding should get lighter and more white/light brown with time.  For bleeding soaking more than a pad an hour or passing clots greater than the size of your fist, come in to the emergency department.    Follow up in clinic in 2 weeks for incision check.  Call clinic for noticeable increase in redness or swelling at incision, discharge from incision, or opening of skin at incision site.  Secondary Diagnosis:	Severe preeclampsia, third trimester  Assessment and plan of treatment:	If you had high blood pressure during your pregnancy, during labor, or after delivery, please follow up with your OB/Gyn in 1-3 days for a BP check.    Continue to monitor your blood pressures three times daily at home (before breakfast, lunch, and dinner).  If any blood pressures are GREATER than 160/110, or if you experience changes in your vision, headache not improved with tylenol, persistent nausea, vomiting and/or right upper abdominal pain, present to ED for further evaluation.    If you are taking blood pressure medication, take your blood pressure before taking your medication. If LESS than 110 systolic (top number) and/or LESS than 60 diastolic (bottom number), do not take your medication.  Secondary Diagnosis:	Severe preeclampsia  Assessment and plan of treatment:	If you had high blood pressure during your pregnancy, during labor, or after delivery, please follow up with your OB/Gyn in 1-3 days for a BP check.    Continue to monitor your blood pressures three times daily at home (before breakfast, lunch, and dinner).  If any blood pressures are GREATER than 160/110, or if you experience changes in your vision, headache not improved with tylenol, persistent nausea, vomiting and/or right upper abdominal pain, present to ED for further evaluation.    If you are taking blood pressure medication, take your blood pressure before taking your medication. If LESS than 110 systolic (top number) and/or LESS than 60 diastolic (bottom number), do not take your medication.

## 2024-08-09 ENCOUNTER — NON-APPOINTMENT (OUTPATIENT)
Age: 34
End: 2024-08-09

## 2024-08-09 PROBLEM — Z00.00 ENCOUNTER FOR PREVENTIVE HEALTH EXAMINATION: Status: ACTIVE | Noted: 2024-08-09

## 2024-08-15 ENCOUNTER — NON-APPOINTMENT (OUTPATIENT)
Age: 34
End: 2024-08-15

## 2024-09-20 ENCOUNTER — NON-APPOINTMENT (OUTPATIENT)
Age: 34
End: 2024-09-20